# Patient Record
Sex: FEMALE | URBAN - METROPOLITAN AREA
[De-identification: names, ages, dates, MRNs, and addresses within clinical notes are randomized per-mention and may not be internally consistent; named-entity substitution may affect disease eponyms.]

---

## 2021-07-14 ENCOUNTER — APPOINTMENT (RX ONLY)
Dept: URBAN - METROPOLITAN AREA CLINIC 111 | Facility: CLINIC | Age: 21
Setting detail: DERMATOLOGY
End: 2021-07-14

## 2021-07-14 VITALS — WEIGHT: 158.4 LBS | HEIGHT: 60 IN

## 2021-07-14 DIAGNOSIS — L90.5 SCAR CONDITIONS AND FIBROSIS OF SKIN: ICD-10-CM

## 2021-07-14 DIAGNOSIS — L91.0 HYPERTROPHIC SCAR: ICD-10-CM

## 2021-07-14 PROCEDURE — ? INTRALESIONAL KENALOG

## 2021-07-14 PROCEDURE — ? COUNSELING

## 2021-07-14 PROCEDURE — 11900 INJECT SKIN LESIONS </W 7: CPT

## 2021-07-14 PROCEDURE — ? TREATMENT REGIMEN

## 2021-07-14 PROCEDURE — 99202 OFFICE O/P NEW SF 15 MIN: CPT | Mod: 25

## 2021-07-14 ASSESSMENT — LOCATION DETAILED DESCRIPTION DERM: LOCATION DETAILED: LEFT KNEE

## 2021-07-14 ASSESSMENT — LOCATION SIMPLE DESCRIPTION DERM: LOCATION SIMPLE: LEFT KNEE

## 2021-07-14 ASSESSMENT — LOCATION ZONE DERM: LOCATION ZONE: LEG

## 2021-07-14 NOTE — PROCEDURE: MIPS QUALITY
Quality 128: Preventive Care And Screening: Body Mass Index (Bmi) Screening And Follow-Up Plan: BMI is documented within normal parameters and no follow-up plan is required.
Quality 431: Preventive Care And Screening: Unhealthy Alcohol Use - Screening: Patient screened for unhealthy alcohol use using a single question and scores less than 2 times per year
Quality 226: Preventive Care And Screening: Tobacco Use: Screening And Cessation Intervention: Patient screened for tobacco use and is an ex/non-smoker
Quality 130: Documentation Of Current Medications In The Medical Record: Current Medications Documented
Detail Level: Detailed

## 2021-07-14 NOTE — PROCEDURE: INTRALESIONAL KENALOG
Size Of Lesion (Optional): 1.2
Validate Note Data When Using Inventory: Yes
Total Volume (Ccs): .4
Include Z78.9 (Other Specified Conditions Influencing Health Status) As An Associated Diagnosis?: No
Detail Level: Simple
Consent: The risks of atrophy were reviewed with the patient.
Kenalog Preparation: Kenalog
Administered By (Optional): LILLIAM Estes
Medical Necessity Clause: This procedure was medically necessary because the lesions that were treated were:
X Size Of Lesion In Cm (Optional): 0
Treatment Number (Optional): 1
Concentration Of Kenalog Solution Injected (Mg/Ml): 20.0

## 2021-08-20 ENCOUNTER — APPOINTMENT (RX ONLY)
Dept: URBAN - METROPOLITAN AREA CLINIC 111 | Facility: CLINIC | Age: 21
Setting detail: DERMATOLOGY
End: 2021-08-20

## 2021-08-20 VITALS — TEMPERATURE: 98.6 F | HEIGHT: 60 IN | WEIGHT: 158 LBS

## 2021-08-20 DIAGNOSIS — L91.0 HYPERTROPHIC SCAR: ICD-10-CM | Status: IMPROVED

## 2021-08-20 PROCEDURE — ? INTRALESIONAL KENALOG

## 2021-08-20 PROCEDURE — 11900 INJECT SKIN LESIONS </W 7: CPT

## 2021-08-20 PROCEDURE — ? TREATMENT REGIMEN

## 2021-08-20 PROCEDURE — ? COUNSELING

## 2021-08-20 ASSESSMENT — LOCATION DETAILED DESCRIPTION DERM: LOCATION DETAILED: LEFT KNEE

## 2021-08-20 ASSESSMENT — LOCATION ZONE DERM: LOCATION ZONE: LEG

## 2021-08-20 ASSESSMENT — LOCATION SIMPLE DESCRIPTION DERM: LOCATION SIMPLE: LEFT KNEE

## 2021-08-20 NOTE — PROCEDURE: INTRALESIONAL KENALOG
Administered By (Optional): Dr. Liss Agudelo
Treatment Number (Optional): 2
Total Volume (Ccs): 0.2
X Size Of Lesion In Cm (Optional): 0
Medical Necessity Clause: This procedure was medically necessary because the lesions that were treated were:
Include Z78.9 (Other Specified Conditions Influencing Health Status) As An Associated Diagnosis?: No
Validate Note Data When Using Inventory: Yes
Size Of Lesion (Optional): 1
Concentration Of Kenalog Solution Injected (Mg/Ml): 20.0
Detail Level: Detailed
Consent: The risks of atrophy were reviewed with the patient.
Kenalog Preparation: Kenalog